# Patient Record
Sex: MALE | Race: BLACK OR AFRICAN AMERICAN
[De-identification: names, ages, dates, MRNs, and addresses within clinical notes are randomized per-mention and may not be internally consistent; named-entity substitution may affect disease eponyms.]

---

## 2018-05-10 ENCOUNTER — HOSPITAL ENCOUNTER (EMERGENCY)
Dept: HOSPITAL 97 - ER | Age: 18
Discharge: HOME | End: 2018-05-10
Payer: SELF-PAY

## 2018-05-10 DIAGNOSIS — R07.9: Primary | ICD-10-CM

## 2018-05-10 LAB
BUN BLD-MCNC: 10 MG/DL (ref 6–20)
GLUCOSE SERPLBLD-MCNC: 124 MG/DL (ref 65–120)
HCT VFR BLD CALC: 38.7 % (ref 39.6–49)
LYMPHOCYTES # SPEC AUTO: 2.5 K/UL (ref 0.7–4.9)
MCH RBC QN AUTO: 30.6 PG (ref 27–35)
MCV RBC: 93.1 FL (ref 80–100)
PMV BLD: 9.1 FL (ref 7.6–11.3)
POTASSIUM SERPL-SCNC: 3.6 MEQ/L (ref 3.6–5)
RBC # BLD: 4.16 M/UL (ref 4.33–5.43)

## 2018-05-10 PROCEDURE — 84484 ASSAY OF TROPONIN QUANT: CPT

## 2018-05-10 PROCEDURE — 99284 EMERGENCY DEPT VISIT MOD MDM: CPT

## 2018-05-10 PROCEDURE — 80048 BASIC METABOLIC PNL TOTAL CA: CPT

## 2018-05-10 PROCEDURE — 85025 COMPLETE CBC W/AUTO DIFF WBC: CPT

## 2018-05-10 PROCEDURE — 36415 COLL VENOUS BLD VENIPUNCTURE: CPT

## 2018-05-10 PROCEDURE — 93005 ELECTROCARDIOGRAM TRACING: CPT

## 2018-05-10 PROCEDURE — 71046 X-RAY EXAM CHEST 2 VIEWS: CPT

## 2018-05-10 NOTE — ER
Nurse's Notes                                                                                     

 [facility]                                                                

Name: Bhavesh Lee                                                                               

Age: 18 yrs                                                                                       

Sex: Male                                                                                         

: 2000                                                                                   

MRN: [id number]                                                                                   

Arrival Date: 05/10/2018                                                                          

Time: 01:19                                                                                       

Account#: [id number]                                                                            

Bed 24                                                                                            

Private MD:                                                                                       

Diagnosis: Chest pain, unspecified                                                                

                                                                                                  

Presentation:                                                                                     

05/10                                                                                             

01:19 Presenting complaint: Patient states: Chest pressure, sudden onset aprox 30 min         mb3 

      earlier. Transition of care: patient was not received from another setting of care.         

      Onset of symptoms was May 10, 2018 at 00:20. Initial Sepsis Screen: Does the patient        

      meet any 2 criteria? No. Patient's initial sepsis screen is negative. Does the patient      

      have a suspected source of infection? No. Patient's initial sepsis screen is negative.      

      Care prior to arrival: Medication(s) given: Normal saline infusion, 250 ml IV               

      initiated. 20 GA, in the left hand.                                                         

01:19 Method Of Arrival: EMS: [facility]                                                         mb3 

01:19 Acuity: [facility]                                                                           mb3 

                                                                                                  

Triage Assessment:                                                                                

:23 General: Appears in no apparent distress. comfortable, Behavior is calm, cooperative,   mb3 

      appropriate for age. Pain: Complains of pain in chest Pain does not radiate. Pain           

      currently is 4 out of 10 on a pain scale. Quality of pain is described as pressure,         

      Pain began suddenly, Is continuous. EENT: No signs and/or symptoms were reported            

      regarding the EENT system. Neuro: No deficits noted. Level of Consciousness is awake,       

      alert, obeys commands. Cardiovascular: Reports chest pain, Denies diaphoresis,              

      lightheadedness, nausea, shortness of breath, Heart tones S1 S2 present Capillary           

      refill < 3 seconds Rhythm is regular Chest pain. Respiratory: No deficits noted. Airway     

      is patent Respiratory effort is even, unlabored, Respiratory pattern is regular,            

      symmetrical, Breath sounds are clear bilaterally. GI: No signs and/or symptoms were         

      reported involving the gastrointestinal system. : No signs and/or symptoms were           

      reported regarding the genitourinary system. Derm: No signs and/or symptoms reported        

      regarding the dermatologic system. Musculoskeletal: No signs and/or symptoms reported       

      regarding the musculoskeletal system.                                                       

                                                                                                  

Historical:                                                                                       

- Allergies:                                                                                      

01: No Known Allergies;                                                                     mb3 

- Home Meds:                                                                                      

: None [Active];                                                                          mb3 

- PMHx:                                                                                           

: None;                                                                                   mb3 

                                                                                                  

- Immunization history:: Adult Immunizations up to date.                                          

- Social history:: Smoking status: Patient/guardian denies using tobacco, never smoked.           

- Family history:: not pertinent.                                                                 

- Hospitalizations: : No recent hospitalization is reported.                                      

                                                                                                  

                                                                                                  

Screenin:54 Abuse screen: Denies threats or abuse. Nutritional screening: No deficits noted.        mb3 

      Tuberculosis screening: No symptoms or risk factors identified. Fall Risk None              

      identified.                                                                                 

                                                                                                  

Assessment:                                                                                       

02:07 Reassessment: No changes from previously documented assessment. Patient and/or family   mb3 

      updated on plan of care and expected duration. Pain level reassessed. Patient is alert,     

      oriented x 3, equal unlabored respirations, skin warm/dry/pink.                             

02:27 Reassessment: Patient and/or family updated on plan of care and expected duration. Pain mb3 

      level reassessed. Patient is alert, oriented x 3, equal unlabored respirations, skin        

      warm/dry/pink. Patient states feeling better. Patient states symptoms have improved.        

                                                                                                  

Vital Signs:                                                                                      

01:26  / 67; Pulse 57; Resp 14; Temp 98.6(O); Pulse Ox 99% on R/A; Weight 106.59 kg;    mb3 

      Height 6 ft. 2 in. (187.96 cm); Pain 4/10;                                                  

01:52  / 69; Pulse 50; Resp 14; Pulse Ox 100% on R/A;                                   mb3 

02:25  / 63; Pulse 49; Resp 14; Pulse Ox 100% on R/A; Pain 0/10;                        mb3 

01:26 Body Mass Index 30.17 (106.59 kg, 187.96 cm)                                            mb3 

                                                                                                  

ED Course:                                                                                        

01:15 Patient has correct armband on for positive identification. Placed in gown. Bed in low  aa1 

      position. Call light in reach. Side rails up X2. Cardiac monitor on. Pulse ox on. NIBP      

      on.                                                                                         

01:19 Patient arrived in ED.                                                                  rn  

01:19 Ed Logan, RN is Primary Nurse.                                                     mb3 

01:19 Triston Pyle MD is Attending Physician.                                                rn  

01:20 EKG done, by ED staff, reviewed by Triston Pyle MD. Maintain EMS IV. Dressing intact.    aa1 

      Good blood return noted. Site clean \T\ dry. Gauge \T\ site: 20g L hand. Converted IV to    

      saline lock on left hand.                                                                   

01:22 Triage completed.                                                                       mb3 

01:35 Patient moved to radiology via wheelchair.                                              kw  

01:35 X-ray completed. Patient tolerated procedure well.                                      kw  

01:35 Patient moved back from radiology.                                                      kw  

01:36 XRAY Chest Pa And Lat (2 Views) In Process Unspecified.                                 EDMS

02:04 Arm band placed on.                                                                     mb3 

02:27 No provider procedures requiring assistance completed. IV discontinued, intact,         mb3 

      bleeding controlled, No redness/swelling at site. Pressure dressing applied.                

                                                                                                  

Administered Medications:                                                                         

No medications were administered                                                                  

                                                                                                  

                                                                                                  

Outcome:                                                                                          

02:17 Discharge ordered by MD.                                                                rn  

02:28 Discharged to home ambulatory.                                                          mb3 

02:28 Condition: stable                                                                           

02:28 Discharge instructions given to patient, Instructed on discharge instructions, follow       

      up and referral plans. Demonstrated understanding of instructions, follow-up care.          

02:29 Patient left the ED.                                                                    mb3 

                                                                                                  

Signatures:                                                                                       

Dispatcher [employer]                           EDHiral Dodd, RN                        RN   aa1                                                  

Triston Pyle MD MD rn Whitley, Kimberlee kw Barnett, Mark RN                       RN   mb3                                                  

                                                                                                  

**************************************************************************************************

## 2018-05-10 NOTE — RAD REPORT
EXAM DESCRIPTION:  RAD - Chest Pa And Lat (2 Views) - 5/10/2018 1:40 am

 

CLINICAL HISTORY:  Chest pain, chest pressure

 

COMPARISON:  September 2017

 

TECHNIQUE:  PA and lateral views of the chest were obtained.

 

FINDINGS:  The lungs are clear.   Heart size is normal and central vasculature is within normal limit
s.  No pleural effusion or pneumothorax seen.  No acute bony finding noted.  No aortic abnormality.  
No significant interval change.

 

IMPRESSION:  No acute cardiopulmonary process.

## 2018-05-10 NOTE — EKG
Test Date:    2018-05-10               Test Time:    01:17:12

Technician:   AK                                     

                                                     

MEASUREMENT RESULTS:                                       

Intervals:                                           

Rate:         64                                     

CA:           140                                    

QRSD:         110                                    

QT:           402                                    

QTc:          414                                    

Axis:                                                

P:            39                                     

CA:           140                                    

QRS:          64                                     

T:            25                                     

                                                     

INTERPRETIVE STATEMENTS:                                       

                                                     

Normal sinus rhythm

Normal ECG

Compared to ECG 12/29/2017 01:48:54

Sinus bradycardia no longer present

Incomplete right bundle-branch block no longer present



Electronically Signed On 05-10-18 05:59:33 CDT by David Gardner

## 2018-05-10 NOTE — EDPHYS
Physician Documentation                                                                           

 [facility]                                                                

Name: Bhavesh Lee                                                                               

Age: 18 yrs                                                                                       

Sex: Male                                                                                         

: 2000                                                                                   

MRN: [id number]                                                                                   

Arrival Date: 05/10/2018                                                                          

Time: 01:19                                                                                       

Account#: [id number]                                                                            

Bed 24                                                                                            

Private MD:                                                                                       

ED Physician Triston Pyle                                                                         

HPI:                                                                                              

05/10                                                                                             

01:46 This 18 yrs old Black Male presents to ER via EMS with complaints of chest pain.        rn  

01:49 The patient or guardian reports chest pain that is located primarily in the chest       rn  

      diffusely. The pain does not radiate. Associated signs and symptoms: Pertinent              

      positives: None. Pertinent negatives: abdominal pain, cough, diaphoresis, dizziness,        

      headache, lower extremity pain, lower extremity swelling, lightheadedness, nausea, near     

      syncope, palpitations, recent travel, shortness of breath, syncope, vomiting. The chest     

      pain is described as aching, dull. Duration: The patient or guardian reports a single       

      episode, that lasted 10 minute(s). Severity of pain: At its worst the pain was mild in      

      the emergency department the pain has resolved. The patient has not experienced similar     

      symptoms in the past. Reports at work, walking to tool box, + chest pain, diffuse,          

      non-radiating, no diaphoresis, no fever/cough/sob, reports rested for 10 minutes and        

      went away, no famhx of early or sudden cardiac death or problems. Feels fine currently.     

      No drug use. No previous syncopal episodes or chest pain for patient in past. .             

                                                                                                  

Historical:                                                                                       

- Allergies:                                                                                      

01:23 No Known Allergies;                                                                     mb3 

- Home Meds:                                                                                      

01:23 None [Active];                                                                          mb3 

- PMHx:                                                                                           

01:23 None;                                                                                   mb3 

                                                                                                  

- Immunization history:: Adult Immunizations up to date.                                          

- Social history:: Smoking status: Patient/guardian denies using tobacco, never smoked.           

- Family history:: not pertinent.                                                                 

- Hospitalizations: : No recent hospitalization is reported.                                      

                                                                                                  

                                                                                                  

ROS:                                                                                              

01:49 Constitutional: Negative for fever, chills, and weight loss, Eyes: Negative for injury, rn  

      pain, redness, and discharge, Neck: Negative for injury, pain, and swelling,                

      Cardiovascular: Negative for palpitations, and edema, Respiratory: Negative for             

      shortness of breath, cough, wheezing, and pleuritic chest pain, Abdomen/GI: Negative        

      for abdominal pain, nausea, vomiting, diarrhea, and constipation, Back: Negative for        

      injury and pain, MS/Extremity: Negative for injury and deformity, Skin: Negative for        

      injury, rash, and discoloration, Neuro: Negative for headache, weakness, numbness,          

      tingling, and seizure.                                                                      

                                                                                                  

Exam:                                                                                             

01:49 Constitutional:  This is a well developed, well nourished patient who is awake, alert,  rn  

      and in no acute distress. Head/Face:  Normocephalic, atraumatic. Eyes:  Pupils equal        

      round and reactive to light, extra-ocular motions intact.  Lids and lashes normal.          

      Conjunctiva and sclera are non-icteric and not injected.  Cornea within normal limits.      

      Periorbital areas with no swelling, redness, or edema. Neck:  Trachea midline, no           

      thyromegaly or masses palpated, and no cervical lymphadenopathy.  Supple, full range of     

      motion without nuchal rigidity, or vertebral point tenderness.  No Meningismus.             

      Cardiovascular:  Regular rate and rhythm with a normal S1 and S2.  No gallops, murmurs,     

      or rubs.  Normal PMI, no JVD.  No pulse deficits. Respiratory:  Lungs have equal breath     

      sounds bilaterally, clear to auscultation and percussion.  No rales, rhonchi or wheezes     

      noted.  No increased work of breathing, no retractions or nasal flaring. Abdomen/GI:        

      Soft, non-tender, with normal bowel sounds.  No distension or tympany.  No guarding or      

      rebound.  No evidence of tenderness throughout. MS/ Extremity:  Pulses equal, no            

      cyanosis.  Neurovascular intact.  Full, normal range of motion.  Equal circumference.       

      Neuro:  Awake and alert, GCS 15, oriented to person, place, time, and situation.            

      Cranial nerves II-XII grossly intact.  Motor strength 5/5 in all extremities.  Sensory      

      grossly intact.                                                                             

02:18 ECG was reviewed by the Attending Physician.                                            rn  

                                                                                                  

Vital Signs:                                                                                      

01:26  / 67; Pulse 57; Resp 14; Temp 98.6(O); Pulse Ox 99% on R/A; Weight 106.59 kg;    mb3 

      Height 6 ft. 2 in. (187.96 cm); Pain 4/10;                                                  

01:52  / 69; Pulse 50; Resp 14; Pulse Ox 100% on R/A;                                   mb3 

02:25  / 63; Pulse 49; Resp 14; Pulse Ox 100% on R/A; Pain 0/10;                        mb3 

01:26 Body Mass Index 30.17 (106.59 kg, 187.96 cm)                                            mb3 

                                                                                                  

MDM:                                                                                              

01:19 Patient medically screened.                                                             rn  

02:16 Differential diagnosis: abnormal EKG, acute myocardial infarction, acute pericarditis,  rn  

      anxiety, coronary artery disease chest wall pain, gastroesophageal reflux disease           

      (GERD), pericarditis, pleurisy, pneumonia, pneumothorax. Data reviewed: vital signs,        

      nurses notes, lab test result(s), EKG, radiologic studies, plain films, and as a            

      result, I will discharge patient. Counseling: I had a detailed discussion with the          

      patient and/or guardian regarding: the historical points, exam findings, and any            

      diagnostic results supporting the discharge/admit diagnosis, lab results, radiology         

      results, the need for outpatient follow up, to return to the emergency department if        

      symptoms worsen or persist or if there are any questions or concerns that arise at          

      home. Response to treatment: the patient's condition has returned to base line, the         

      patient is now symptom free, and as a result, I will discharge patient. Special             

      discussion: Based on the patient's history, exam, and Dx evaluation, there is no            

      indication for emergent intervention or inpatient Tx. It is understood by the               

      patient/guardian that if the Sx's persist or worsen they need to return immediately for     

      re-evaluation. I discussed with the patient/guardian in detail that at this point there     

      is no indication for admission to the hospital. It is understood, however, that if the      

      symptoms persist or worsen the patient needs to return immediately for re-evaluation.       

                                                                                                  

05/10                                                                                             

01:21 Order name: CBC with Diff; Complete Time: 02:02                                         rn  

05/10                                                                                             

01:21 Order name: Basic Metabolic Panel; Complete Time: 02:02                                 rn  

05/10                                                                                             

01:21 Order name: XRAY Chest Pa And Lat (2 Views)                                             rn  

05/10                                                                                             

01:21 Order name: IV Start; Complete Time: 01:34                                              rn  

05/10                                                                                             

01:21 Order name: EKG; Complete Time: 01:21                                                   rn  

05/10                                                                                             

01:21 Order name: Troponin (emerg Dept Use Only); Complete Time: 02:15                        rn  

05/10                                                                                             

01:21 Order name: EKG - Nurse/Tech; Complete Time: 01:33                                      rn  

                                                                                                  

EC:18 Rate is 64 beats/min. Rhythm is regular. QRS Axis is Normal. RI interval is normal. QRS rn  

      interval is normal. QT interval is normal. No Q waves. T waves are Normal. No ST            

      changes noted. Clinical impression: Normal ECG. Interpreted by me.                          

                                                                                                  

Administered Medications:                                                                         

No medications were administered                                                                  

                                                                                                  

                                                                                                  

Disposition:                                                                                      

05/10/18 02:17 Discharged to Home. Impression: Chest pain, unspecified.                           

- Condition is Stable.                                                                            

- Discharge Instructions: Nonspecific Chest Pain.                                                 

                                                                                                  

- Medication Reconciliation Form, Thank You Letter, Antibiotic Education, Prescription            

  Opioid Use form.                                                                                

- Follow up: Private Physician; When: As needed; Reason: Recheck today's complaints,              

  Re-evaluation by your physician.                                                                

- Problem is new.                                                                                 

- Symptoms have improved.                                                                         

                                                                                                  

                                                                                                  

                                                                                                  

Signatures:                                                                                       

Dispatcher [employer]                           EDMS                                                 

Triston Pyle MD MD rn Barnett, Mark, RN                       RN   mb3                                                  

                                                                                                  

Corrections: (The following items were deleted from the chart)                                    

02:29 02:17 05/10/2018 02:17 Discharged to Home. Impression: Chest pain, unspecified.         mb3 

      Condition is Stable. Forms are Medication Reconciliation Form, Thank You Letter,            

      Antibiotic Education, Prescription Opioid Use. Follow up: Private Physician; When: As       

      needed; Reason: Recheck today's complaints, Re-evaluation by your physician. Problem is     

      new. Symptoms have improved. rn                                                             

                                                                                                  

**************************************************************************************************